# Patient Record
Sex: MALE | Race: WHITE | Employment: OTHER | URBAN - METROPOLITAN AREA
[De-identification: names, ages, dates, MRNs, and addresses within clinical notes are randomized per-mention and may not be internally consistent; named-entity substitution may affect disease eponyms.]

---

## 2019-12-11 ENCOUNTER — APPOINTMENT (OUTPATIENT)
Dept: GENERAL RADIOLOGY | Age: 29
End: 2019-12-11
Attending: EMERGENCY MEDICINE
Payer: OTHER GOVERNMENT

## 2019-12-11 ENCOUNTER — HOSPITAL ENCOUNTER (EMERGENCY)
Age: 29
Discharge: HOME | End: 2019-12-11
Attending: EMERGENCY MEDICINE
Payer: OTHER GOVERNMENT

## 2019-12-11 VITALS
OXYGEN SATURATION: 96 % | SYSTOLIC BLOOD PRESSURE: 122 MMHG | HEIGHT: 73 IN | HEART RATE: 91 BPM | RESPIRATION RATE: 23 BRPM | BODY MASS INDEX: 31.81 KG/M2 | WEIGHT: 240 LBS | DIASTOLIC BLOOD PRESSURE: 83 MMHG

## 2019-12-11 DIAGNOSIS — R07.9 ACUTE CHEST PAIN: Primary | ICD-10-CM

## 2019-12-11 LAB
ALBUMIN SERPL-MCNC: 4.5 G/DL (ref 3.4–5)
ALBUMIN/GLOB SERPL: 1.4 {RATIO} (ref 0.8–1.7)
ALP SERPL-CCNC: 53 U/L (ref 45–117)
ALT SERPL-CCNC: 137 U/L (ref 16–61)
ANION GAP SERPL CALC-SCNC: 4 MMOL/L (ref 3–18)
AST SERPL-CCNC: 46 U/L (ref 10–38)
ATRIAL RATE: 103 BPM
ATRIAL RATE: 88 BPM
BASOPHILS # BLD: 0 K/UL (ref 0–0.1)
BASOPHILS NFR BLD: 0 % (ref 0–2)
BILIRUB SERPL-MCNC: 0.7 MG/DL (ref 0.2–1)
BUN SERPL-MCNC: 10 MG/DL (ref 7–18)
BUN/CREAT SERPL: 10 (ref 12–20)
CALCIUM SERPL-MCNC: 9.4 MG/DL (ref 8.5–10.1)
CALCULATED P AXIS, ECG09: 38 DEGREES
CALCULATED P AXIS, ECG09: 48 DEGREES
CALCULATED R AXIS, ECG10: 11 DEGREES
CALCULATED R AXIS, ECG10: 8 DEGREES
CALCULATED T AXIS, ECG11: 16 DEGREES
CALCULATED T AXIS, ECG11: 24 DEGREES
CHLORIDE SERPL-SCNC: 105 MMOL/L (ref 100–111)
CK MB CFR SERPL CALC: NORMAL % (ref 0–4)
CK MB SERPL-MCNC: <1 NG/ML (ref 5–25)
CK SERPL-CCNC: 135 U/L (ref 39–308)
CO2 SERPL-SCNC: 28 MMOL/L (ref 21–32)
CREAT SERPL-MCNC: 1 MG/DL (ref 0.6–1.3)
DIAGNOSIS, 93000: NORMAL
DIAGNOSIS, 93000: NORMAL
DIFFERENTIAL METHOD BLD: ABNORMAL
EOSINOPHIL # BLD: 0.1 K/UL (ref 0–0.4)
EOSINOPHIL NFR BLD: 1 % (ref 0–5)
ERYTHROCYTE [DISTWIDTH] IN BLOOD BY AUTOMATED COUNT: 12.3 % (ref 11.6–14.5)
GLOBULIN SER CALC-MCNC: 3.2 G/DL (ref 2–4)
GLUCOSE SERPL-MCNC: 102 MG/DL (ref 74–99)
HCT VFR BLD AUTO: 44.5 % (ref 36–48)
HGB BLD-MCNC: 16.2 G/DL (ref 13–16)
LYMPHOCYTES # BLD: 1.4 K/UL (ref 0.9–3.6)
LYMPHOCYTES NFR BLD: 15 % (ref 21–52)
MCH RBC QN AUTO: 32 PG (ref 24–34)
MCHC RBC AUTO-ENTMCNC: 36.4 G/DL (ref 31–37)
MCV RBC AUTO: 87.9 FL (ref 74–97)
MONOCYTES # BLD: 0.5 K/UL (ref 0.05–1.2)
MONOCYTES NFR BLD: 5 % (ref 3–10)
NEUTS SEG # BLD: 7.3 K/UL (ref 1.8–8)
NEUTS SEG NFR BLD: 79 % (ref 40–73)
P-R INTERVAL, ECG05: 132 MS
P-R INTERVAL, ECG05: 152 MS
PLATELET # BLD AUTO: 192 K/UL (ref 135–420)
PMV BLD AUTO: 11.4 FL (ref 9.2–11.8)
POTASSIUM SERPL-SCNC: 4.3 MMOL/L (ref 3.5–5.5)
PROT SERPL-MCNC: 7.7 G/DL (ref 6.4–8.2)
Q-T INTERVAL, ECG07: 340 MS
Q-T INTERVAL, ECG07: 356 MS
QRS DURATION, ECG06: 108 MS
QRS DURATION, ECG06: 110 MS
QTC CALCULATION (BEZET), ECG08: 430 MS
QTC CALCULATION (BEZET), ECG08: 445 MS
RBC # BLD AUTO: 5.06 M/UL (ref 4.7–5.5)
SODIUM SERPL-SCNC: 137 MMOL/L (ref 136–145)
TROPONIN I SERPL-MCNC: <0.02 NG/ML (ref 0–0.04)
TROPONIN I SERPL-MCNC: <0.02 NG/ML (ref 0–0.04)
VENTRICULAR RATE, ECG03: 103 BPM
VENTRICULAR RATE, ECG03: 88 BPM
WBC # BLD AUTO: 9.3 K/UL (ref 4.6–13.2)

## 2019-12-11 PROCEDURE — 85025 COMPLETE CBC W/AUTO DIFF WBC: CPT

## 2019-12-11 PROCEDURE — 80053 COMPREHEN METABOLIC PANEL: CPT

## 2019-12-11 PROCEDURE — 74011000250 HC RX REV CODE- 250: Performed by: EMERGENCY MEDICINE

## 2019-12-11 PROCEDURE — 71045 X-RAY EXAM CHEST 1 VIEW: CPT

## 2019-12-11 PROCEDURE — 82550 ASSAY OF CK (CPK): CPT

## 2019-12-11 PROCEDURE — 93005 ELECTROCARDIOGRAM TRACING: CPT

## 2019-12-11 PROCEDURE — 74011250637 HC RX REV CODE- 250/637: Performed by: EMERGENCY MEDICINE

## 2019-12-11 PROCEDURE — 99285 EMERGENCY DEPT VISIT HI MDM: CPT

## 2019-12-11 RX ORDER — BISMUTH SUBSALICYLATE 262 MG
1 TABLET,CHEWABLE ORAL DAILY
COMMUNITY

## 2019-12-11 RX ORDER — IBUPROFEN 600 MG/1
600 TABLET ORAL
Status: COMPLETED | OUTPATIENT
Start: 2019-12-11 | End: 2019-12-11

## 2019-12-11 RX ORDER — LIDOCAINE 4 G/100G
1 PATCH TOPICAL EVERY 24 HOURS
Status: DISCONTINUED | OUTPATIENT
Start: 2019-12-11 | End: 2019-12-11 | Stop reason: HOSPADM

## 2019-12-11 RX ADMIN — IBUPROFEN 600 MG: 600 TABLET, FILM COATED ORAL at 12:39

## 2019-12-11 NOTE — ED NOTES
Pt resting on stretcher. Upon review of chart, it was noted troponin was previously ordered as POC. Specimen was collected for serum and sent to lab earlier. Lab orders entered and lab notified of need for specimen to be run ASAP.

## 2019-12-11 NOTE — ED NOTES
Pt resting on stretcher. Still has not had XRAY. Is due for repeat blood draw at 1230. Will check back then and f/u if XRAY still has not been done. VSS. SR on monitor. No c/o at this time.

## 2019-12-11 NOTE — ED NOTES
Pt resting comfortably on stretcher. Blood was collected and sent. I did apologize for delay. EKG was done upon arrival, and pt still needs CXR. I attempted to call RAD, but line was busy. Will f/u about PCXR. VSS at this time. Will cont to monitor.

## 2019-12-11 NOTE — ED PROVIDER NOTES
Date: 12/11/2019  Patient Name: [facility]    History of Presenting Illness     Chief Complaint   Patient presents with    Chest Pain       History Provided By: Patient    HPI/Chief Complaint: (Context):who presents with chief complaint of chest pain scapular pain on the right side initially after patient was doing sit ups and then push-ups patient subsequently started running and family syndrome patient started to get left-sided chest pain as well it is with breathing and movement as well and palpation  No history of heavy lifting no trauma  No history of exertional chest pain or shortness of breath patient said he had similar episode in October as well when he was running. No other symptoms when he runs he can run without any difficulty or chest pain or shortness of breath  No radiation symptoms for the patient. Symptoms were right-sided then became left-sided. No history of MI PE no history of family history of MI or PE no traveling no calf pain no leg swelling  Patient denies any fever  Patient denies any shortness of breath currently  Patient still has some pain  Patient states his symptoms started 8:30 AM and the sharp component lasted for 60 minutes and now just dull component remains. No upper respiratory symptoms no focal arm or leg weakness no neck pain no radiculopathy no abdominal pain no bowel bladder incontinence.      ===  Patient's triage note is reviewed  Patient's chief complaint of chest pain  Patient sudden onset chest pain while working out this a.m. right scapula also some lightheadedness left-sided chest pain   allergies none  Home medications none  Surgical history none  Allergies none  ===      PCP: None        Past History     Past Medical History:  No past medical history on file. Past Surgical History:  No past surgical history on file. Family History:  No family history on file.     Social History:  Social History     Tobacco Use    Smoking status: Not on file Substance Use Topics    Alcohol use: Not on file    Drug use: Not on file       Allergies: Allergies no known allergies      Review of Systems   Review of Systems   Constitutional: Negative for activity change, fatigue and fever. HENT: Negative for congestion and rhinorrhea. Eyes: Negative for visual disturbance. Respiratory: Negative for shortness of breath. Cardiovascular: Positive for chest pain. Negative for palpitations. Gastrointestinal: Negative for abdominal pain, diarrhea, nausea and vomiting. Genitourinary: Negative for dysuria and hematuria. Musculoskeletal: Negative for back pain. Skin: Negative for rash. Neurological: Negative for dizziness, weakness and light-headedness. Psychiatric/Behavioral: Negative for agitation. All other systems reviewed and are negative. Physical Exam     Physical Exam  Constitutional:       Appearance: He is well-developed. HENT:      Head: Normocephalic and atraumatic. Eyes:      Conjunctiva/sclera: Conjunctivae normal.      Pupils: Pupils are equal, round, and reactive to light. Neck:      Musculoskeletal: Normal range of motion and neck supple. Cardiovascular:      Rate and Rhythm: Normal rate and regular rhythm. Heart sounds: Normal heart sounds. Heart sounds not distant. No murmur. No systolic murmur. No diastolic murmur. No friction rub. No gallop. Pulmonary:      Effort: Pulmonary effort is normal.      Breath sounds: Normal breath sounds. Chest:      Chest wall: Tenderness present. Comments: Left chest palpable tenderness. No rash no bruising  Abdominal:      General: Bowel sounds are normal.      Palpations: Abdomen is soft. Musculoskeletal: Normal range of motion. Lymphadenopathy:      Cervical: No cervical adenopathy. Skin:     General: Skin is warm. Neurological:      Mental Status: He is alert. Medical Decision Making   I am the first provider for this patient.     I reviewed the vital signs, available nursing notes, past medical history, past surgical history, family history and social history. Provider Notes (Medical Decision Making): Patient with chest pain right-sided radiating to the left and stayed on the left side palpable reproducible  Symptoms started 8:30 AM  I will check troponin x2-second 1 done at noon. Patient will get repeat EKG as well  Patient's initial EKG does not show any dysrhythmia or ischemia. Patient's risk of PE is low as patient has no travel no calf pain no history of it  If patient's EKG and troponins negative I will have patient follow-up outpatient for further imaging and testing as patient has repeat visits. I have asked the patient to discuss with his medical officer that he will need further testing for his heart as this is second episode episode since October. Vital Signs-Reviewed the patient's vital signs. Pulse Oximetry Analysis -97%, room air, normal    Cardiac Monitor:  Rate/Rhythm: 97, sinus rhythm    EKG: Interpreted by the EP. Time of the EKG is 9:39 AM, 102 heart rate sinus tach, normal intervals normal axis no sign of acute ischemia or dysrhythmia on EKG. EKG #2 done in emergency department time of the EKG is 1250  88 normal intervals normal axes there is no sign of acute ischemia or dysrhythmia on this EKG. Vitals:    12/11/19 1130 12/11/19 1200 12/11/19 1215 12/11/19 1245   BP: 128/67 127/83 (!) 146/95 124/75   Pulse: 96 93 97 98   Resp: 23 22 20 20   SpO2: 95% 94% 96% 97%   Weight:       Height:           Records Reviewed: Nursing Notes    ED Course:      2:03 PM  Patient with no chest pain no shortness of breath no abdominal pain  No vomiting no diarrhea  Patient is well-appearing  Discharge Note:  2:04 PM  The pt is ready for discharge. The pt's signs, symptoms, diagnosis, and discharge instructions have been discussed and pt has conveyed their understanding.  The pt is to follow up as recommended or return to ER should their symptoms worsen. Plan has been discussed and pt is in agreement. Diagnostic Study Results     Labs -     Recent Results (from the past 12 hour(s))   EKG, 12 LEAD, INITIAL    Collection Time: 12/11/19  9:31 AM   Result Value Ref Range    Ventricular Rate 103 BPM    Atrial Rate 103 BPM    P-R Interval 132 ms    QRS Duration 108 ms    Q-T Interval 340 ms    QTC Calculation (Bezet) 445 ms    Calculated P Axis 48 degrees    Calculated R Axis 8 degrees    Calculated T Axis 24 degrees    Diagnosis       Sinus tachycardia  Otherwise normal ECG  No previous ECGs available  Confirmed by Severo Norman ([id number]) on 12/11/2019 11:06:33 AM     CBC WITH AUTOMATED DIFF    Collection Time: 12/11/19 11:27 AM   Result Value Ref Range    WBC 9.3 4.6 - 13.2 K/uL    RBC 5.06 4.70 - 5.50 M/uL    HGB 16.2 (H) 13.0 - 16.0 g/dL    HCT 44.5 36.0 - 48.0 %    MCV 87.9 74.0 - 97.0 FL    MCH 32.0 24.0 - 34.0 PG    MCHC 36.4 31.0 - 37.0 g/dL    RDW 12.3 11.6 - 14.5 %    PLATELET 011 850 - 870 K/uL    MPV 11.4 9.2 - 11.8 FL    NEUTROPHILS 79 (H) 40 - 73 %    LYMPHOCYTES 15 (L) 21 - 52 %    MONOCYTES 5 3 - 10 %    EOSINOPHILS 1 0 - 5 %    BASOPHILS 0 0 - 2 %    ABS. NEUTROPHILS 7.3 1.8 - 8.0 K/UL    ABS. LYMPHOCYTES 1.4 0.9 - 3.6 K/UL    ABS. MONOCYTES 0.5 0.05 - 1.2 K/UL    ABS. EOSINOPHILS 0.1 0.0 - 0.4 K/UL    ABS.  BASOPHILS 0.0 0.0 - 0.1 K/UL    DF AUTOMATED     METABOLIC PANEL, COMPREHENSIVE    Collection Time: 12/11/19 11:27 AM   Result Value Ref Range    Sodium 137 136 - 145 mmol/L    Potassium 4.3 3.5 - 5.5 mmol/L    Chloride 105 100 - 111 mmol/L    CO2 28 21 - 32 mmol/L    Anion gap 4 3.0 - 18 mmol/L    Glucose 102 (H) 74 - 99 mg/dL    BUN 10 7.0 - 18 MG/DL    Creatinine 1.00 0.6 - 1.3 MG/DL    BUN/Creatinine ratio 10 (L) 12 - 20      GFR est AA >60 >60 ml/min/1.73m2    GFR est non-AA >60 >60 ml/min/1.73m2    Calcium 9.4 8.5 - 10.1 MG/DL    Bilirubin, total 0.7 0.2 - 1.0 MG/DL    ALT (SGPT) 137 (H) 16 - 61 U/L    AST (SGOT) 46 (H) 10 - 38 U/L Alk. phosphatase 53 45 - 117 U/L    Protein, total 7.7 6.4 - 8.2 g/dL    Albumin 4.5 3.4 - 5.0 g/dL    Globulin 3.2 2.0 - 4.0 g/dL    A-G Ratio 1.4 0.8 - 1.7     CARDIAC PANEL,(CK, CKMB & TROPONIN)    Collection Time: 12/11/19 11:27 AM   Result Value Ref Range     39 - 308 U/L    CK - MB <1.0 <3.6 ng/ml    CK-MB Index  0.0 - 4.0 %     CALCULATION NOT PERFORMED WHEN RESULT IS BELOW LINEAR LIMIT    Troponin-I, QT <0.02 0.0 - 0.045 NG/ML   TROPONIN I    Collection Time: 12/11/19 12:44 PM   Result Value Ref Range    Troponin-I, QT <0.02 0.0 - 0.045 NG/ML   EKG, 12 LEAD, SUBSEQUENT    Collection Time: 12/11/19 12:50 PM   Result Value Ref Range    Ventricular Rate 88 BPM    Atrial Rate 88 BPM    P-R Interval 152 ms    QRS Duration 110 ms    Q-T Interval 356 ms    QTC Calculation (Bezet) 430 ms    Calculated P Axis 38 degrees    Calculated R Axis 11 degrees    Calculated T Axis 16 degrees    Diagnosis       Normal sinus rhythm  Anterior infarct , age undetermined  Abnormal ECG  When compared with ECG of 11-DEC-2019 09:31,  No significant change was found         Radiologic Studies -   XR CHEST PORT   Final Result   IMPRESSION:      Underexpanded lungs without superimposed acute radiographic abnormality. CT Results  (Last 48 hours)    None        CXR Results  (Last 48 hours)               12/11/19 1243  XR CHEST PORT Final result    Impression:  IMPRESSION:       Underexpanded lungs without superimposed acute radiographic abnormality. Narrative:  EXAM: XR CHEST PORT       CLINICAL INDICATION/HISTORY: Chest pain   -Additional: None       COMPARISON: None       TECHNIQUE: Frontal view of the chest       _______________       FINDINGS:       HEART AND MEDIASTINUM: Normal cardiac size and mediastinal contours. LUNGS AND PLEURAL SPACES: Lungs are underexpanded. No focal pneumonic   consolidation, pneumothorax, or pleural effusion.        BONY THORAX AND SOFT TISSUES: No acute osseous abnormality _______________                 Discharge     Clinical Impression:   1.  Acute chest pain        Disposition:  Home    It should be noted that I will be the provider of record for this patient  Roselia Padron MD      Follow-up Information     Follow up With Specialties Details Why [address] [place] [address] EMERGENCY DEPT Emergency Medicine  If symptoms worsen [address]  [phone]    Your [place] physician for further care  Call in 1 day Follow Up From Emergency Department           Current Discharge Medication List

## 2019-12-11 NOTE — DISCHARGE INSTRUCTIONS

## 2019-12-11 NOTE — ED NOTES
I have reviewed discharge instructions with the patient. The patient verbalized understanding. Pt agrees with DC plan of care, and advised to remove Lido patch before bed.  Pt ambulatory to lobby

## 2019-12-11 NOTE — ED NOTES
Pt presents with c/o sudden onset CP while doing PT  (working out) this AM. Pain began behind right scapula and radiated around to [facility]. Pt says pain is less than initally at this time, and now located to left chest. Pt also reports feeling a little light headed initially, but now resolved. Denies any SOB. Pt is A&O x4. Skin pink, warm and dry. HR regular. SR on monitor. S1/ S2 heard. Pain to left chest is reproducable on palpation, and =4 at this time. Resp even and unlabored, although it hurts to take a deep breath. Lungs CTA. No other s/s or assessment findings at this time.